# Patient Record
Sex: FEMALE | Race: WHITE | URBAN - METROPOLITAN AREA
[De-identification: names, ages, dates, MRNs, and addresses within clinical notes are randomized per-mention and may not be internally consistent; named-entity substitution may affect disease eponyms.]

---

## 2023-10-25 ENCOUNTER — OFFICE VISIT (OUTPATIENT)
Dept: UROGYNECOLOGY | Age: 58
End: 2023-10-25
Payer: COMMERCIAL

## 2023-10-25 VITALS — WEIGHT: 213 LBS | BODY MASS INDEX: 36.37 KG/M2 | HEIGHT: 64 IN

## 2023-10-25 DIAGNOSIS — N39.3 SUI (STRESS URINARY INCONTINENCE, FEMALE): ICD-10-CM

## 2023-10-25 DIAGNOSIS — N81.89 WEAKNESS OF PELVIC FLOOR: ICD-10-CM

## 2023-10-25 DIAGNOSIS — N39.41 URGE INCONTINENCE: ICD-10-CM

## 2023-10-25 DIAGNOSIS — N81.3 UTEROVAGINAL PROLAPSE, COMPLETE: Primary | ICD-10-CM

## 2023-10-25 LAB
BILIRUBIN, URINE, POC: NEGATIVE
BLOOD URINE, POC: NEGATIVE
GLUCOSE URINE, POC: NEGATIVE
KETONES, URINE, POC: NEGATIVE
LEUKOCYTE ESTERASE, URINE, POC: NEGATIVE
NITRITE, URINE, POC: NEGATIVE
PH, URINE, POC: 6 (ref 4.6–8)
PROTEIN,URINE, POC: NEGATIVE
SPECIFIC GRAVITY, URINE, POC: 1.02 (ref 1–1.03)
URINALYSIS CLARITY, POC: CLEAR
URINALYSIS COLOR, POC: YELLOW
UROBILINOGEN, POC: NORMAL

## 2023-10-25 PROCEDURE — 99204 OFFICE O/P NEW MOD 45 MIN: CPT | Performed by: OBSTETRICS & GYNECOLOGY

## 2023-10-25 PROCEDURE — 81003 URINALYSIS AUTO W/O SCOPE: CPT | Performed by: OBSTETRICS & GYNECOLOGY

## 2023-10-25 PROCEDURE — 51701 INSERT BLADDER CATHETER: CPT | Performed by: OBSTETRICS & GYNECOLOGY

## 2023-10-25 RX ORDER — ARMODAFINIL 250 MG/1
TABLET ORAL
COMMUNITY
Start: 2023-10-10

## 2023-10-25 RX ORDER — ALBUTEROL SULFATE 90 UG/1
AEROSOL, METERED RESPIRATORY (INHALATION)
COMMUNITY
Start: 2023-07-28

## 2023-10-25 RX ORDER — ESCITALOPRAM OXALATE 20 MG/1
TABLET ORAL
COMMUNITY
Start: 2011-04-19

## 2023-10-25 RX ORDER — FLUTICASONE PROPIONATE 50 MCG
SPRAY, SUSPENSION (ML) NASAL
COMMUNITY
Start: 2022-03-07

## 2023-10-25 RX ORDER — TRAMADOL HYDROCHLORIDE 50 MG/1
50 TABLET ORAL EVERY 6 HOURS PRN
COMMUNITY
Start: 2012-04-09

## 2023-10-25 RX ORDER — TIZANIDINE 4 MG/1
TABLET ORAL
COMMUNITY
Start: 2012-03-28

## 2023-10-25 RX ORDER — ENALAPRIL MALEATE 10 MG/1
TABLET ORAL
COMMUNITY
Start: 2023-10-10

## 2023-10-25 NOTE — ASSESSMENT & PLAN NOTE
We discussed the epidemiology, pathogenesis and etiology of pelvic organ prolapse. This is a chronic condition that has progressed. We discussed the potential risk factors which include genetics and environmental factors, such as childbirth, aging, menopause, straining, and previous surgery. I offered her management options which included nothing, pessary, and surgery. She is interested in: surgery    Decisions regarding major surgery were discussed today.

## 2023-10-25 NOTE — ASSESSMENT & PLAN NOTE
We discussed the differential diagnosis of urinary incontinence. We also discussed the pathogenesis and etiology of stress urinary incontinence. I explained the epidemiology of incontinence. I offered her options which include nothing, physical therapy, barrier treatment, and surgery. She demonstrated arielle today on CST. Sling info.

## 2023-10-25 NOTE — ASSESSMENT & PLAN NOTE
We discussed the role that weight and BMI have on bladder function. Research has shown that 8% weight loss can reduce incontinence rates by 50%. I advised her on weight training/resistence, cardio.  I also advise protein, getting 30g of protein with every meal. IV discontinued, cath removed intact

## 2023-10-25 NOTE — PROGRESS NOTES
nothing, physical therapy, barrier treatment, and surgery. She demonstrated arielle today on CST. Sling info. Orders:  -     INSERT,NON-INDWELLING BLADDER CATHETER  -     AMB POC URINALYSIS DIP STICK AUTO W/O MICRO  3. Weakness of pelvic floor  Assessment & Plan:  We discussed the purpose of physical therapy which is to strengthen the pelvic floor muscles and teach proper coordination of those muscles. I described the anatomy of those muscles involved and their relationship to the end-organs in the pelvis. I described therapy techniques which include a combination of therapeutic exercise, biofeedback, neuromuscular re-education, home programs, and electrical stimulation, as well as therapeutic massage and ultrasound for pain. I recommend Physical therapy and will send a referral post op. 4. BMI 36.0-36.9,adult  Assessment & Plan:  We discussed the role that weight and BMI have on bladder function. Research has shown that 8% weight loss can reduce incontinence rates by 50%. I advised her on weight training/resistence, cardio. I also advise protein, getting 30g of protein with every meal.       5. Urge incontinence  Assessment & Plan:  We discussed the differential diagnosis of overactive bladder. We discussed the epidemiology, pathogenesis, and etiology of bladder overactivity including the neurophysiologic axis. We also discussed the treatment pathway for OAB. I offered options which include nothing, medications, physical therapy, behavior modification, and neuromodulation. We discussed treating the POP/ arielle first and if that does not help the UUI, we will consider UUI therapy/ treatment. Return for Pre Op. On this date 10/25/2023 I have spent 47 minutes reviewing previous notes, test results and face to face with the patient discussing the diagnosis and importance of compliance with the treatment plan as well as documenting on the day of the visit.     Katt Javier,

## 2023-10-25 NOTE — ASSESSMENT & PLAN NOTE
We discussed the differential diagnosis of overactive bladder. We discussed the epidemiology, pathogenesis, and etiology of bladder overactivity including the neurophysiologic axis. We also discussed the treatment pathway for OAB. I offered options which include nothing, medications, physical therapy, behavior modification, and neuromodulation. We discussed treating the POP/ arielle first and if that does not help the UUI, we will consider UUI therapy/ treatment.

## 2023-11-16 ENCOUNTER — OFFICE VISIT (OUTPATIENT)
Dept: UROGYNECOLOGY | Age: 58
End: 2023-11-16
Payer: COMMERCIAL

## 2023-11-16 DIAGNOSIS — N81.89 WEAKNESS OF PELVIC FLOOR: ICD-10-CM

## 2023-11-16 DIAGNOSIS — N39.41 URGE INCONTINENCE: ICD-10-CM

## 2023-11-16 DIAGNOSIS — N81.3 UTEROVAGINAL PROLAPSE, COMPLETE: Primary | ICD-10-CM

## 2023-11-16 DIAGNOSIS — N39.3 SUI (STRESS URINARY INCONTINENCE, FEMALE): ICD-10-CM

## 2023-11-16 PROCEDURE — 99215 OFFICE O/P EST HI 40 MIN: CPT | Performed by: OBSTETRICS & GYNECOLOGY

## 2023-11-16 NOTE — PROGRESS NOTES
course. We discussed using the [address] robot for assisting in the surgical procedure. I explained the function and purpose of the robot which greatly enhances my ability to perform the reconstruction. We discussed that the care may not be able to be completed laparoscopically and that a laparotomy may become necessary. There is a >80% success rate. We discussed that some patients do fail, although not all require another surgery. We discussed the risks of repair which include pain, dysparunia, bladder and/or bowel dysfunction and sexual dysfunction. We discussed the properties of polypropylene mesh. We discussed the inert nature and the potential for complication, including infections, rejection, and erosion. The risk of erosion is <10%. We discussed the FDA warning on mesh use. I will also perform a cystoscopy to evaluate her bladder anatomy. Risks, benefits, indications, and alternatives of the surgery were discussed. Risks reviewed include bleeding, infections, injury to pelvic organs (bowel, bladder, nerves, vessels and ureters), recurrence, dyspareunia, anesthetic complications, and death. We discussed that other complications could arise and that the list is too long to discuss comprehensively. After taking all of this into account, she elects to proceed with Robotic assisted laparoscopic prolapse repair (sacrocolpopexy) with Y mesh midurethral sling and camera in the bladder (cystoscopy). Possible vaginal prolapse repair. 2. BECKY (stress urinary incontinence, female)  Assessment & Plan:  Stress Incontinence  We discussed the differential diagnosis of urinary incontinence. We also discussed the pathogenesis and etiology of stress urinary incontinence. I explained the epidemiology of incontinence. I offered her options which include nothing, physical therapy, barrier treatment, and surgery. She is interested in surgical treatment.      I described the surgical technique to be

## 2023-11-16 NOTE — PATIENT INSTRUCTIONS
(sacrocolpopexy) with Y mesh midurethral sling and camera in the bladder (cystoscopy). Possible vaginal prolapse repair. I described the surgical technique to be employed for her upcoming surgery. We discussed the anticipated postoperative course. We discussed using the [address] robot for assisting in the surgical procedure. I explained the function and purpose of the robot which greatly enhances my ability to perform the reconstruction. We discussed that the care may not be able to be completed laparoscopically and that a laparotomy may become necessary. There is a >80% success rate. We discussed that some patients do fail, although not all require another surgery. We discussed the risks of repair which include pain, dysparunia, bladder and/or bowel dysfunction and sexual dysfunction. We discussed the properties of polypropylene mesh. We discussed the inert nature and the potential for complication, including infections, rejection, and erosion. The risk of erosion is <10%. We discussed the FDA warning on mesh use. I will also perform a cystoscopy to evaluate her bladder anatomy. Risks, benefits, indications, and alternatives of the surgery were discussed. Risks reviewed include bleeding, infections, injury to pelvic organs (bowel, bladder, nerves, vessels and ureters), recurrence, dyspareunia, anesthetic complications, and death. We discussed that other complications could arise and that the list is too long to discuss comprehensively. After taking all of this into account, she elects to proceed with Robotic assisted laparoscopic prolapse repair (sacrocolpopexy) with Y mesh midurethral sling and camera in the bladder (cystoscopy). Possible vaginal prolapse repair.

## 2023-11-16 NOTE — ASSESSMENT & PLAN NOTE
Stress Incontinence  We discussed the differential diagnosis of urinary incontinence. We also discussed the pathogenesis and etiology of stress urinary incontinence. I explained the epidemiology of incontinence. I offered her options which include nothing, physical therapy, barrier treatment, and surgery. She is interested in surgical treatment. I described the surgical technique to be employed for her upcoming surgery. We discussed the anticipated postoperative course. TVT cure rates at 6 years is up to 85%. We discussed the properties of polypropylene mesh. We discussed the inert nature and the potential for complication, including infections, rejection, and erosion. The risk of erosion is <10%. We discussed the FDA warning on mesh use. Risks, benefits, indications, and alternatives of the surgery were discussed. Risks reviewed include bleeding, infections, injury to pelvic organs (bladder, nerves, vessels, urethra, and ureters), recurrence, urinary retention, dyspareunia, anesthetic complications, and death. We discussed that other complications could arise and that the list is too long to discuss comprehensively.
We discussed that urge incontinence may improve with her POP surgery, however this is not the intention. If she is still have UUI postop we can discuss further treatment at that time. She understood.
bladder, nerves, vessels and ureters), recurrence, dyspareunia, anesthetic complications, and death. We discussed that other complications could arise and that the list is too long to discuss comprehensively. After taking all of this into account, she elects to proceed with Robotic assisted laparoscopic prolapse repair (sacrocolpopexy) with Y mesh midurethral sling and camera in the bladder (cystoscopy). Possible vaginal prolapse repair.

## 2023-12-05 ENCOUNTER — PREP FOR PROCEDURE (OUTPATIENT)
Dept: UROGYNECOLOGY | Age: 58
End: 2023-12-05

## 2023-12-05 DIAGNOSIS — N39.3 STRESS INCONTINENCE, FEMALE: ICD-10-CM

## 2023-12-13 ENCOUNTER — TELEPHONE (OUTPATIENT)
Dept: SURGERY | Age: 58
End: 2023-12-13

## 2023-12-18 PROBLEM — G89.18 POST-OP PAIN: Status: ACTIVE | Noted: 2023-12-18

## 2024-01-25 ENCOUNTER — TELEPHONE (OUTPATIENT)
Dept: UROGYNECOLOGY | Age: 59
End: 2024-01-25

## 2024-01-25 ENCOUNTER — OFFICE VISIT (OUTPATIENT)
Dept: UROGYNECOLOGY | Age: 59
End: 2024-01-25

## 2024-01-25 VITALS — BODY MASS INDEX: 36.01 KG/M2 | WEIGHT: 209.8 LBS

## 2024-01-25 DIAGNOSIS — N81.3 UTEROVAGINAL PROLAPSE, COMPLETE: ICD-10-CM

## 2024-01-25 DIAGNOSIS — N81.89 WEAKNESS OF PELVIC FLOOR: Primary | ICD-10-CM

## 2024-01-25 PROCEDURE — 99024 POSTOP FOLLOW-UP VISIT: CPT | Performed by: OBSTETRICS & GYNECOLOGY

## 2024-01-25 NOTE — TELEPHONE ENCOUNTER
Patient called with concerns states that she do not understand her assessment plan.  Advised pt per Dr. Cowart her assessment/plan is as follow:    For another 4 weeks:  No heavy lifting ( greater than 5 lbs)  No bathing/ soaking. No hot tubs etc. You may shower.  Nothing in the vagina (sex, tampons, douching)     You may slowly start to resume your normal activity.     You have sutures in the vagina that may start to come out (this is normal) they look like white thread.      I recommend physical therapy.     Per the pt her concern is that she return to work on 01/29/2024 and her work note does not specify restrictions

## 2024-01-25 NOTE — ASSESSMENT & PLAN NOTE
For another 4 weeks:  No heavy lifting ( greater than 5 lbs)  No bathing/ soaking. No hot tubs etc. You may shower.  Nothing in the vagina (sex, tampons, douching)    You may slowly start to resume your normal activity.    You have sutures in the vagina that may start to come out (this is normal) they look like white thread.     I recommend physical therapy.   We discussed the purpose of physical therapy which is to strengthen the pelvic floor muscles and teach proper coordination of those muscles. I described the anatomy of those muscles involved and their relationship to the end-organs in the pelvis. I described therapy techniques which include a combination of therapeutic exercise, biofeedback, neuromuscular re-education, home programs, and electrical stimulation, as well as therapeutic massage and ultrasound for pain.    I recommend Physical therapy and will send a referral.         Left message, calling to check on pt. [phone]

## 2024-01-25 NOTE — PROGRESS NOTES
[facility]  [address]  [address]  [place] [address]  Dept: [phone]        PCP:  Cooksey, Erin L, MD    1/25/2024    Chief Complaint   Patient presents with    Post-Op Check     Patient c/o inability of stop voiding once she has started.       HPI:  Vanda Alvarez is here for her postop check c/o inability to control urine once she has started voiding.    She had a Laparoscopic lysis of adhesions, anterior wall repair, paravaginal repair, posterior wall repair and midurethral sling w Cystoscopy on 12/18/2023. She is doing very well today. She denies fevers, chills nausea, vomiting, chest pain, shortness of breath. She is ambulating, tolerating a regular diet, and pain is well controlled. She does not have any vaginal bleeding and gradually getting back to doing her normal activities of daily living. She does not have any difficulty with urination or bowel movements.       No results found for this visit on 01/25/24.    Wt 95.2 kg (209 lb 12.8 oz)   BMI 36.01 kg/m²     Exam: This includes at least 4 minutes of clinical staff time associated with chaperoning a pelvic exam.  Incisions:  Clean, Dry, and Intact. Healing well.   Vulva:    Normal. No lesions  Bartholin's Gland:  Bilateral , Normal, nontender  Skenes Gland:  Bilateral, Normal, nontender   Clitoris:  Normal.   Introitus:    Normal.   Urethral Meatus:  Normal appearing, normal size, no lesions, no prolapse  Urethra:  No masses, no tenderness  Vagina:  No atrophy, no discharge, no lesions, no signs of mesh erosion or complication.   Adnexa:   No masses palpated, no tenderness  Bladder:  No tenderness, no masses palpated  Perineum:  Normal, no lesions    Rectal   Anorectal Exam: No hemorrhoids and no masses or lesions of the perineum           10/25/2023     3:00 PM   Pelvic Organ Prolapse Quantification   Anterior Wall (Aa) 2   Anterior Wall (Ba) 2   Cervix or Cuff (C) -6   Genital Haitus (gh) 4   Perineal Body (pb) 4   Total

## 2024-01-25 NOTE — TELEPHONE ENCOUNTER
I called the patient to let her know we will write a work release stating she can return to work 1/29/24 with a 10lb restrictions. Vanda is going to call and get a fax number so we can send it to her employer.

## 2024-02-08 ENCOUNTER — TELEPHONE (OUTPATIENT)
Dept: UROGYNECOLOGY | Age: 59
End: 2024-02-08

## 2024-02-08 NOTE — TELEPHONE ENCOUNTER
Patient called requesting a work letter stating that she can return to work on Monday 02/12/2024 faxed to [phone]    Work letter in [employer]    [employer] letter was faxed to pt job and confirmed by Princess.    Pt notified.

## 2024-04-29 ENCOUNTER — OFFICE VISIT (OUTPATIENT)
Dept: UROGYNECOLOGY | Age: 59
End: 2024-04-29
Payer: COMMERCIAL

## 2024-04-29 DIAGNOSIS — N81.3 UTEROVAGINAL PROLAPSE, COMPLETE: Primary | ICD-10-CM

## 2024-04-29 PROCEDURE — 99213 OFFICE O/P EST LOW 20 MIN: CPT | Performed by: OBSTETRICS & GYNECOLOGY

## 2024-04-29 PROCEDURE — 99459 PELVIC EXAMINATION: CPT | Performed by: OBSTETRICS & GYNECOLOGY

## 2024-04-29 NOTE — PROGRESS NOTES
4/29/2024     4:00 PM 10/25/2023     3:00 PM   Pelvic Organ Prolapse Quantification   Anterior Wall (Aa) 0 2   Anterior Wall (Ba) 0 2   Cervix or Cuff (C) -6 -6   Genital Haitus (gh) 3 4   Perineal Body (pb) 4 4   Total Vaginal Length (tvl) 8 9   Posterior Wall (Ap) -3 0   Posterior Wall (Bp) -3 0   Posterior Fornix (D) x x          1. Uterovaginal prolapse, complete  Assessment & Plan:  She is doing well. She is s/p PT with Marilou at [facility]. She is doing her HEP.     You are now 4 months postop:  You no longer have lifting, or bathing restrictions. You should be back to your normal activities of daily living.     I am releasing you back to your primary care provider. Should your symptoms recur, or new symptoms arise, please do not hesitate to call our office for an appointment.           No follow-up provider specified.    On this date 4/29/2024 I have spent 21 minutes reviewing previous notes, test results and face to face with the patient discussing the diagnosis and importance of compliance with the treatment plan as well as documenting on the day of the visit.      Ida Cowart,

## 2024-04-29 NOTE — ASSESSMENT & PLAN NOTE
She is doing well. She is s/p PT with Marilou at [facility]. She is doing her HEP.     You are now 4 months postop:  You no longer have lifting, or bathing restrictions. You should be back to your normal activities of daily living.     I am releasing you back to your primary care provider. Should your symptoms recur, or new symptoms arise, please do not hesitate to call our office for an appointment.